# Patient Record
Sex: FEMALE | Race: WHITE | NOT HISPANIC OR LATINO | Employment: FULL TIME | URBAN - METROPOLITAN AREA
[De-identification: names, ages, dates, MRNs, and addresses within clinical notes are randomized per-mention and may not be internally consistent; named-entity substitution may affect disease eponyms.]

---

## 2017-05-30 PROBLEM — O99.210 OBESITY AFFECTING PREGNANCY, ANTEPARTUM: Status: ACTIVE | Noted: 2017-05-30

## 2017-05-30 PROBLEM — O09.521 SUPERVISION OF ELDERLY MULTIGRAVIDA IN FIRST TRIMESTER: Status: ACTIVE | Noted: 2017-05-30

## 2017-07-11 PROBLEM — O09.522 SUPERVISION OF ELDERLY MULTIGRAVIDA IN SECOND TRIMESTER: Status: ACTIVE | Noted: 2017-05-30

## 2019-11-20 PROBLEM — N92.1 METRORRHAGIA: Status: ACTIVE | Noted: 2019-11-20

## 2019-11-20 PROBLEM — O09.522 SUPERVISION OF ELDERLY MULTIGRAVIDA IN SECOND TRIMESTER: Status: RESOLVED | Noted: 2017-05-30 | Resolved: 2019-11-20

## 2019-11-20 PROBLEM — O99.210 OBESITY AFFECTING PREGNANCY, ANTEPARTUM: Status: RESOLVED | Noted: 2017-05-30 | Resolved: 2019-11-20

## 2023-02-09 ENCOUNTER — OFFICE VISIT (OUTPATIENT)
Dept: NEUROLOGY | Facility: CLINIC | Age: 48
End: 2023-02-09
Payer: COMMERCIAL

## 2023-02-09 VITALS
BODY MASS INDEX: 30.76 KG/M2 | WEIGHT: 162.94 LBS | DIASTOLIC BLOOD PRESSURE: 75 MMHG | SYSTOLIC BLOOD PRESSURE: 112 MMHG | HEIGHT: 61 IN | HEART RATE: 83 BPM

## 2023-02-09 DIAGNOSIS — R20.0 NUMBNESS: ICD-10-CM

## 2023-02-09 DIAGNOSIS — R20.2 PARESTHESIAS: Primary | ICD-10-CM

## 2023-02-09 PROCEDURE — 3008F PR BODY MASS INDEX (BMI) DOCUMENTED: ICD-10-PCS | Mod: CPTII,S$GLB,, | Performed by: PSYCHIATRY & NEUROLOGY

## 2023-02-09 PROCEDURE — 1160F PR REVIEW ALL MEDS BY PRESCRIBER/CLIN PHARMACIST DOCUMENTED: ICD-10-PCS | Mod: CPTII,S$GLB,, | Performed by: PSYCHIATRY & NEUROLOGY

## 2023-02-09 PROCEDURE — 1159F PR MEDICATION LIST DOCUMENTED IN MEDICAL RECORD: ICD-10-PCS | Mod: CPTII,S$GLB,, | Performed by: PSYCHIATRY & NEUROLOGY

## 2023-02-09 PROCEDURE — 1159F MED LIST DOCD IN RCRD: CPT | Mod: CPTII,S$GLB,, | Performed by: PSYCHIATRY & NEUROLOGY

## 2023-02-09 PROCEDURE — 3078F PR MOST RECENT DIASTOLIC BLOOD PRESSURE < 80 MM HG: ICD-10-PCS | Mod: CPTII,S$GLB,, | Performed by: PSYCHIATRY & NEUROLOGY

## 2023-02-09 PROCEDURE — 3008F BODY MASS INDEX DOCD: CPT | Mod: CPTII,S$GLB,, | Performed by: PSYCHIATRY & NEUROLOGY

## 2023-02-09 PROCEDURE — 99204 OFFICE O/P NEW MOD 45 MIN: CPT | Mod: S$GLB,,, | Performed by: PSYCHIATRY & NEUROLOGY

## 2023-02-09 PROCEDURE — 3074F SYST BP LT 130 MM HG: CPT | Mod: CPTII,S$GLB,, | Performed by: PSYCHIATRY & NEUROLOGY

## 2023-02-09 PROCEDURE — 1160F RVW MEDS BY RX/DR IN RCRD: CPT | Mod: CPTII,S$GLB,, | Performed by: PSYCHIATRY & NEUROLOGY

## 2023-02-09 PROCEDURE — 99999 PR PBB SHADOW E&M-NEW PATIENT-LVL III: ICD-10-PCS | Mod: PBBFAC,,, | Performed by: PSYCHIATRY & NEUROLOGY

## 2023-02-09 PROCEDURE — 3078F DIAST BP <80 MM HG: CPT | Mod: CPTII,S$GLB,, | Performed by: PSYCHIATRY & NEUROLOGY

## 2023-02-09 PROCEDURE — 99999 PR PBB SHADOW E&M-NEW PATIENT-LVL III: CPT | Mod: PBBFAC,,, | Performed by: PSYCHIATRY & NEUROLOGY

## 2023-02-09 PROCEDURE — 99204 PR OFFICE/OUTPT VISIT, NEW, LEVL IV, 45-59 MIN: ICD-10-PCS | Mod: S$GLB,,, | Performed by: PSYCHIATRY & NEUROLOGY

## 2023-02-09 PROCEDURE — 3074F PR MOST RECENT SYSTOLIC BLOOD PRESSURE < 130 MM HG: ICD-10-PCS | Mod: CPTII,S$GLB,, | Performed by: PSYCHIATRY & NEUROLOGY

## 2023-02-09 RX ORDER — AMOXICILLIN 500 MG
CAPSULE ORAL DAILY
COMMUNITY
End: 2023-03-01

## 2023-02-09 RX ORDER — MULTIVITAMIN
1 TABLET ORAL DAILY
COMMUNITY
End: 2023-03-01

## 2023-02-09 RX ORDER — ACETAMINOPHEN 500 MG
TABLET ORAL DAILY
COMMUNITY
End: 2023-03-01

## 2023-02-09 RX ORDER — NICOTINE POLACRILEX 2 MG
GUM BUCCAL
COMMUNITY
End: 2023-03-01

## 2023-02-09 NOTE — PROGRESS NOTES
Date: 2/9/2023    Patient ID: Alpa Randle is a 48 y.o. female.      Chief Complaint: Numbness      History of Present Illness:  Ms. Randle is a 48 y.o. female who presents today for evaluation of numbness.     She has had numbness for about the past year. This is from her knees to feet and elbows to hands bilaterally. It is also a tingling sensation. No burning or pain.  It comes and goes. It used to just be the arms and she could prevent it from happening by sleeping flat on her back. If she sleeps on her side this will trigger it. It feels like it is mostly the first 3 fingers and the bottom of her feet and middle 3 toes. This seems worse when she first wakes up and then improves through the day. She currently still feels numb in her fingertips and in her toes on the bottom on her feet.     No neck pain. No muscle weakness.     She has h/o gastric sleeve in 2009. She takes vitamin supplements with multiviatmin, biotin and vitamin D.     She will have more palpitations sometimes when the tingling is worse.     Allergies:  Review of patient's allergies indicates:   Allergen Reactions    Morphine Nausea And Vomiting    Sulfa (sulfonamide antibiotics) Swelling and Rash       Current Medications:  Current Outpatient Medications   Medication Sig Dispense Refill    biotin 1 mg Cap Take by mouth.      cholecalciferol, vitamin D3, (VITAMIN D3) 50 mcg (2,000 unit) Cap capsule Take by mouth once daily.      multivitamin (ONE DAILY MULTIVITAMIN) per tablet Take 1 tablet by mouth once daily.      omega-3 fatty acids/fish oil (FISH OIL-OMEGA-3 FATTY ACIDS) 300-1,000 mg capsule Take by mouth once daily.       No current facility-administered medications for this visit.       Past Medical History:  Past Medical History:   Diagnosis Date    Cutaneous lupus erythematosus     Hx gestational diabetes     Metrorrhagia        Past Surgical History:  Past Surgical History:   Procedure Laterality Date    CYSTOSCOPY N/A 11/20/2019     "Procedure: CYSTOSCOPY;  Surgeon: Marcus Aiken MD;  Location: [facility];  Service: OB/GYN;  Laterality: N/A;    DILATION AND CURETTAGE OF UTERUS  2016    HYSTERECTOMY  11/2019    ROBOT-ASSISTED LAPAROSCOPIC EXCISION OF CYST OF OVARY Left 11/20/2019    Procedure: ROBOTIC CYSTECTOMY, OVARIAN;  Surgeon: Marcus Aiken MD;  Location: [facility] OR;  Service: OB/GYN;  Laterality: Left;    ROBOT-ASSISTED LAPAROSCOPIC HYSTERECTOMY N/A 11/20/2019    Procedure: ROBOTIC HYSTERECTOMY;  Surgeon: Marcus Aiken MD;  Location: [facility] OR;  Service: OB/GYN;  Laterality: N/A;    ROBOT-ASSISTED SALPINGECTOMY Bilateral 11/20/2019    Procedure: ROBOTIC SALPINGECTOMY;  Surgeon: Marcus Aiken MD;  Location: [facility] OR;  Service: OB/GYN;  Laterality: Bilateral;    SLEEVE GASTROPLASTY      TONSILLECTOMY         Family History:  family history includes Diabetes in her father; Hypertension in her father; No Known Problems in her mother.    Social History:   reports that she has never smoked. She has never used smokeless tobacco. She reports current alcohol use of about 2.0 standard drinks per week. She reports that she does not use drugs.    Physical Exam:  Vitals:    02/09/23 1341   BP: 112/75   Pulse: 83   Weight: 73.9 kg (162 lb 14.7 oz)   Height: 5' 1" (1.549 m)   PainSc: 0-No pain     Body mass index is 30.78 kg/m².    Neurological Exam:  Mental status: Awake, alert.  Speech/Language: No dysarthria or aphasia on conversation.   Cranial nerves: Pupils equal round and reactive to light, extraocular movements intact, facial strength and sensation intact bilaterally, tongue midline, hearing grossly intact bilaterally. Shoulder shrug normal bilaterally.   Motor: 5 out of 5 strength throughout the upper and lower extremities bilaterally. Normal bulk and tone.   Sensation: Intact to light touch, pinprick, and vibration bilaterally.  DTR: 2+ at the knees and biceps bilaterally.  Coordination: Finger-nose-finger testing and rapid alternating movements " normal bilaterally. No tremor.   Gait: Normal gait    Data:  I have personally reviewed the referring provider's notes, labs, & imaging made available to me today.     Labs:  CBC:   Lab Results   Component Value Date    WBC 8.90 11/13/2019    HGB 12.0 11/13/2019    HCT 39.4 11/13/2019     (H) 11/13/2019    MCV 82 11/13/2019    RDW 13.7 11/13/2019     BMP:   Lab Results   Component Value Date     11/13/2019    K 4.3 11/13/2019     11/13/2019    CO2 28 11/13/2019    BUN 10 11/13/2019    CREATININE 0.70 11/13/2019    GLU 83 11/13/2019    CALCIUM 9.7 11/13/2019     LFTS;   Lab Results   Component Value Date    PROT 7.2 11/13/2019    ALBUMIN 4.1 11/13/2019    BILITOT 0.4 11/13/2019    AST 21 11/13/2019    ALKPHOS 85 11/13/2019    ALT 9 (L) 11/13/2019     COAGS: No results found for: INR, PROTIME, PTT  FLP: No results found for: CHOL, HDL, LDLCALC, TRIG, CHOLHDL        Assessment and Plan:  Ms. Randle is a 48 y.o. female here for numbness in her arms and legs. This is intermittent and differential includes mild neuropathy vs mononeuropathies like CTS and sciatica vs radiculopathies. Physical exam is normal arguing against neuropathy. Will obtain labwork given her h/o gastric sleeve and will obtain EMG 1 UE and LE (she can say whichever one is worse that day) as well to evaluate.     Paresthesias  -     Vitamin B12; Future; Expected date: 02/09/2023  -     VITAMIN B1; Future; Expected date: 02/09/2023  -     VITAMIN E; Future; Expected date: 02/09/2023  -     VITAMIN B6; Future; Expected date: 02/09/2023  -     VITAMIN B2; Future; Expected date: 02/09/2023  -     FOLATE; Future; Expected date: 02/09/2023  -     Copper, serum; Future; Expected date: 02/09/2023  -     Ceruloplasmin; Future; Expected date: 02/09/2023  -     ZINC; Future; Expected date: 02/09/2023  -     Misc Sendout Test, Blood 25 hydroxy vitamin D; Future; Expected date: 02/09/2023  -     CARLOS by cory BARRAZA/Thi; Future; Expected date:  02/09/2023  -     HEMOGLOBIN A1C; Future; Expected date: 02/09/2023  -     TSH; Future; Expected date: 02/09/2023  -     IMMUNOFIXATION ELECTROPHORESIS, SERUM; Future; Expected date: 02/09/2023  -     PROTEIN ELECTROPHORESIS, SERUM; Future; Expected date: 02/09/2023  -     EMG W/ ULTRASOUND AND NERVE CONDUCTION TEST 2 Extremities; Future    Numbness  -     Vitamin B12; Future; Expected date: 02/09/2023  -     VITAMIN B1; Future; Expected date: 02/09/2023  -     VITAMIN E; Future; Expected date: 02/09/2023  -     VITAMIN B6; Future; Expected date: 02/09/2023  -     VITAMIN B2; Future; Expected date: 02/09/2023  -     FOLATE; Future; Expected date: 02/09/2023  -     Copper, serum; Future; Expected date: 02/09/2023  -     Ceruloplasmin; Future; Expected date: 02/09/2023  -     ZINC; Future; Expected date: 02/09/2023  -     Misc Sendout Test, Blood 25 hydroxy vitamin D; Future; Expected date: 02/09/2023  -     CARLOS by IFA, w/Rflx; Future; Expected date: 02/09/2023  -     HEMOGLOBIN A1C; Future; Expected date: 02/09/2023  -     TSH; Future; Expected date: 02/09/2023  -     IMMUNOFIXATION ELECTROPHORESIS, SERUM; Future; Expected date: 02/09/2023  -     PROTEIN ELECTROPHORESIS, SERUM; Future; Expected date: 02/09/2023  -     EMG W/ ULTRASOUND AND NERVE CONDUCTION TEST 2 Extremities; Future       I spent a total of 45 minutes on the day of the visit.This includes face to face time and non-face to face time preparing to see the patient (eg, review of tests), Obtaining and/or reviewing separately obtained history, Documenting clinical information in the electronic or other health record, Independently interpreting results and communicating results to the patient/family/caregiver, or Care coordination.

## 2023-02-14 ENCOUNTER — PROCEDURE VISIT (OUTPATIENT)
Dept: NEUROLOGY | Facility: CLINIC | Age: 48
End: 2023-02-14
Payer: COMMERCIAL

## 2023-02-14 DIAGNOSIS — R20.0 NUMBNESS: ICD-10-CM

## 2023-02-14 DIAGNOSIS — M54.12 C7 RADICULOPATHY: ICD-10-CM

## 2023-02-14 DIAGNOSIS — M54.17 LUMBOSACRAL RADICULOPATHY AT L4: Primary | ICD-10-CM

## 2023-02-14 DIAGNOSIS — R20.2 PARESTHESIAS: ICD-10-CM

## 2023-02-14 PROCEDURE — 95911 NRV CNDJ TEST 9-10 STUDIES: CPT | Mod: S$GLB,,, | Performed by: PSYCHIATRY & NEUROLOGY

## 2023-02-14 PROCEDURE — 95886 MUSC TEST DONE W/N TEST COMP: CPT | Mod: S$GLB,,, | Performed by: PSYCHIATRY & NEUROLOGY

## 2023-02-14 PROCEDURE — 95911 PR NERVE CONDUCTION STUDY; 9-10 STUDIES: ICD-10-PCS | Mod: S$GLB,,, | Performed by: PSYCHIATRY & NEUROLOGY

## 2023-02-14 PROCEDURE — 95885 MUSC TST DONE W/NERV TST LIM: CPT | Mod: 59,S$GLB,, | Performed by: PSYCHIATRY & NEUROLOGY

## 2023-02-14 PROCEDURE — 95885 PR MUSC TST DONE W/NERV TST LIM: ICD-10-PCS | Mod: 59,S$GLB,, | Performed by: PSYCHIATRY & NEUROLOGY

## 2023-02-14 PROCEDURE — 95886 PR EMG COMPLETE, W/ NERVE CONDUCTION STUDIES, 5+ MUSCLES: ICD-10-PCS | Mod: S$GLB,,, | Performed by: PSYCHIATRY & NEUROLOGY

## 2023-02-14 NOTE — PROCEDURES
[facility]  [facility]  [address]  [place] [address]  [phone]      Full Name: Alpa Randle Gender: Female  Patient ID: [id number] YOB: 1975      Visit Date: 2/14/2023 1:07 PM  Age: 48 Years  Examining Physician: Arabella House M.D.   Referring Physician: Arabella House M.D.   Technologist: ENMANUEL Marie   Height: 5 feet 1 inch  History: Numbness of bilateral upper and lower extremities that starts from the elbows to the hand and from knees to feet.         Sensory NCS      Nerve / Sites Rec. Site Onset Lat Peak Lat NP Amp Segments Distance Peak Diff Velocity Temp.     ms ms µV  cm ms m/s °C   R Median - Digit II (Antidromic)      Wrist Dig II 2.52 3.38 37.4 Wrist - Dig II 13  52 32.3      Ref.   ?3.40 ?20.0 Ref.   ?50    R Median, Ulnar - Transcarpal comparison      Median Palm Wrist 1.54 2.10 45.0 Median Palm - Wrist 8  52 32.3      Ref.   ?2.20  Ref.          Ulnar Palm Wrist 1.50 2.15 33.1 Ulnar Palm - Wrist 8  53 32.3      Ref.   ?2.20  Ref.            Median Palm - Ulnar Palm  -0.04  32.3        Ref.  ?0.40     R Ulnar - Digit V (Antidromic)      Wrist Dig V 2.19 3.06 35.1 Wrist - Dig V 11  50 32.3      Ref.   ?3.10 ?15.0 Ref.   ?50    R Sural - Ankle (Calf)      Calf Ankle 2.69 3.40 11.9 Calf - Ankle 14  52 32.3      Ref.   ?4.50 ?5.0 Ref.   ?40    R Superficial peroneal - Ankle      Lat leg Ankle 2.08 2.79 10.4 Lat leg - Ankle 12  58 32.3      Ref.   ?4.50 ?5.0 Ref.           Motor NCS      Nerve / Sites Muscle Latency Ref. Amplitude Ref. Amp % Duration Segments Distance Lat Diff Ref. Velocity Ref. Temp.     ms ms mV mV % ms  cm ms ms m/s m/s °C   R Median - APB      Wrist APB 3.00 ?3.90 14.3 ?6.0 100 7.31 Wrist - APB      32.3      Elbow APB 6.63  14.3  101 7.33 Elbow - Wrist 20.5 3.62  57 ?50 32.3   R Ulnar - ADM      Wrist ADM 2.67 ?3.10 14.5 ?7.0 100 8.50 Wrist - ADM      32.3      B.Elbow ADM 5.44  13.2  90.9 8.35 B.Elbow - Wrist 17  2.77  61 ?50 32.3      A.Elbow ADM 7.10  12.4  85.5 7.81 A.Elbow - B.Elbow 10 1.67  60  32.3   R Peroneal - EDB      Ankle EDB 3.56 ?5.50 4.8 ?3.0 100 5.25 Ankle - EDB      32.3      Fib head EDB 9.58  3.9  82.2 5.77 Fib head - Ankle 29.5 6.02  49 ?40 32.3      Pop fossa EDB 11.67  3.0  61.8 6.15 Pop fossa - Fib head 10 2.08  48  32.3   R Tibial - AH      Ankle AH 3.06 ?6.00 8.3 ?6.0 100 6.21 Ankle - AH      32.3      Pop fossa AH 10.71  7.9  94.8 7.15 Pop fossa - Ankle 34 7.65  44 ?40 32.3   R Median, Ulnar - Lumbrical-Interossei      Median Wrist Lumb II 3.58  1.1  100 7.71 Median Wrist - Lumb II 10     32.3      Ulnar Wrist Lumb II 3.13  8.3  755 5.29 Ulnar Wrist - Lumb II 10     32.3           Median Wrist - Ulnar Wrist  0.46 ?0.50   32.3       F  Wave      Nerve Fmin Ref.    ms ms   R Median - APB 24.38 ?31.00   R Ulnar - ADM 23.80 ?32.00   R Peroneal - EDB 44.06 ?56.00   R Tibial - AH 45.57 ?56.00       EMG Summary Table     Spontaneous Recruitment Activation Duration Amplitude Polyphasia Comment   Muscle Ins Act Fib Fasc Pattern - - - - -   R. Deltoid Normal 0 0 Normal Normal Normal Normal Normal Normal   R. Triceps brachii Normal 0 0 Sl Dec Normal +1 +1 Normal Normal   R. Pronator teres Normal 0 0 Sl Dec Normal Normal N/+1 +1 Normal   R. First dorsal interosseous Normal 0 0 Normal Normal Normal Normal Normal Normal   R. Tibialis anterior Normal 0 0 Sl Dec Normal Normal Normal Normal Normal   R. Gastrocnemius (Medial head) Normal 0 0 Normal Normal Normal Normal Normal Normal   R. Vastus medialis Normal 0 0 Sl Dec Normal +1 +1 Normal Normal   R. Biceps femoris (short head) Normal 0 0 Normal Normal Normal Normal Normal Normal   R. Abductor hallucis Normal 0 0 Normal Normal Normal Normal Normal Normal         Summary: Right upper extremity nerve conduction studies are normal.     Right upper extremity needle examination shows mild neurogenic changes in the C6/7-innervated muscles. No fibrillation potentials were  seen.     Right lower extremity nerve conduction studies are normal.     Right lower extremity needle examination shows mild neurogenic changes in the L4 innervated muscles. No fibrillation potentials were seen.     Impression: There is EMG evidence of right C6/7 radiculopathy and right L4 radiculopathy. There is no evidence of active denervation.     There is no evidence of median neuropathy, ulnar neuropathy, nor peripheral neuropathy.       Arabella House M.D.

## 2023-02-15 ENCOUNTER — PATIENT MESSAGE (OUTPATIENT)
Dept: NEUROLOGY | Facility: CLINIC | Age: 48
End: 2023-02-15
Payer: COMMERCIAL

## 2023-03-01 ENCOUNTER — HOSPITAL ENCOUNTER (OUTPATIENT)
Dept: RADIOLOGY | Facility: HOSPITAL | Age: 48
Discharge: HOME | End: 2023-03-01
Attending: PSYCHIATRY & NEUROLOGY
Payer: COMMERCIAL

## 2023-03-01 DIAGNOSIS — M54.12 C7 RADICULOPATHY: ICD-10-CM

## 2023-03-01 DIAGNOSIS — R20.2 PARESTHESIAS: ICD-10-CM

## 2023-03-01 DIAGNOSIS — R20.0 NUMBNESS: ICD-10-CM

## 2023-03-01 DIAGNOSIS — M54.17 LUMBOSACRAL RADICULOPATHY AT L4: ICD-10-CM

## 2023-03-01 PROCEDURE — 72148 MRI LUMBAR SPINE W/O DYE: CPT | Mod: 26,,, | Performed by: RADIOLOGY

## 2023-03-01 PROCEDURE — 72141 MRI CERVICAL SPINE WITHOUT CONTRAST: ICD-10-PCS | Mod: 26,,, | Performed by: RADIOLOGY

## 2023-03-01 PROCEDURE — 72141 MRI NECK SPINE W/O DYE: CPT | Mod: TC,PO

## 2023-03-01 PROCEDURE — 72141 MRI NECK SPINE W/O DYE: CPT | Mod: 26,,, | Performed by: RADIOLOGY

## 2023-03-01 PROCEDURE — 72148 MRI LUMBAR SPINE W/O DYE: CPT | Mod: TC,PO

## 2023-03-01 PROCEDURE — 72148 MRI LUMBAR SPINE WITHOUT CONTRAST: ICD-10-PCS | Mod: 26,,, | Performed by: RADIOLOGY

## 2023-03-02 ENCOUNTER — PATIENT MESSAGE (OUTPATIENT)
Dept: NEUROLOGY | Facility: CLINIC | Age: 48
End: 2023-03-02
Payer: COMMERCIAL

## 2023-03-02 DIAGNOSIS — M54.16 LUMBAR RADICULOPATHY: Primary | ICD-10-CM

## 2023-03-03 ENCOUNTER — PATIENT MESSAGE (OUTPATIENT)
Dept: NEUROLOGY | Facility: CLINIC | Age: 48
End: 2023-03-03
Payer: COMMERCIAL

## 2023-03-04 ENCOUNTER — PATIENT MESSAGE (OUTPATIENT)
Dept: NEUROLOGY | Facility: CLINIC | Age: 48
End: 2023-03-04
Payer: COMMERCIAL